# Patient Record
Sex: FEMALE | Race: AMERICAN INDIAN OR ALASKA NATIVE
[De-identification: names, ages, dates, MRNs, and addresses within clinical notes are randomized per-mention and may not be internally consistent; named-entity substitution may affect disease eponyms.]

---

## 2017-09-26 ENCOUNTER — HOSPITAL ENCOUNTER (OUTPATIENT)
Dept: HOSPITAL 5 - XRAY | Age: 37
Discharge: HOME | End: 2017-09-26
Attending: INTERNAL MEDICINE
Payer: MEDICARE

## 2017-09-26 DIAGNOSIS — R10.9: Primary | ICD-10-CM

## 2017-09-26 DIAGNOSIS — Z90.49: ICD-10-CM

## 2017-09-26 PROCEDURE — 74020: CPT

## 2017-09-26 NOTE — XRAY REPORT
ABDOMEN, 2 views:



History: Abdominal pain.



There is no evidence of free air beneath the diaphragms.  The gas 

pattern within the abdomen is unremarkable. There is no evidence of 

bowel dilatation, significant air-fluid levels, or pathologic 

calcifications.  Organ shadows are unremarkable.



Cholecystectomy changes are noted. A peritoneal dialysis catheter 

terminates in the right side of the pelvis.



IMPRESSION:

Unremarkable abdomen.

## 2017-10-17 ENCOUNTER — HOSPITAL ENCOUNTER (EMERGENCY)
Dept: HOSPITAL 5 - ED | Age: 37
LOS: 1 days | Discharge: HOME | End: 2017-10-18
Payer: MEDICARE

## 2017-10-17 DIAGNOSIS — Z91.013: ICD-10-CM

## 2017-10-17 DIAGNOSIS — I12.0: ICD-10-CM

## 2017-10-17 DIAGNOSIS — Z79.01: ICD-10-CM

## 2017-10-17 DIAGNOSIS — N18.6: ICD-10-CM

## 2017-10-17 DIAGNOSIS — Z88.2: ICD-10-CM

## 2017-10-17 DIAGNOSIS — I50.9: ICD-10-CM

## 2017-10-17 DIAGNOSIS — Z88.8: ICD-10-CM

## 2017-10-17 DIAGNOSIS — E11.22: ICD-10-CM

## 2017-10-17 DIAGNOSIS — Z79.4: ICD-10-CM

## 2017-10-17 DIAGNOSIS — M79.661: Primary | ICD-10-CM

## 2017-10-17 PROCEDURE — 85610 PROTHROMBIN TIME: CPT

## 2017-10-17 PROCEDURE — 80048 BASIC METABOLIC PNL TOTAL CA: CPT

## 2017-10-17 PROCEDURE — 36415 COLL VENOUS BLD VENIPUNCTURE: CPT

## 2017-10-17 PROCEDURE — 85025 COMPLETE CBC W/AUTO DIFF WBC: CPT

## 2017-10-17 PROCEDURE — 85379 FIBRIN DEGRADATION QUANT: CPT

## 2017-10-18 VITALS — DIASTOLIC BLOOD PRESSURE: 91 MMHG | SYSTOLIC BLOOD PRESSURE: 187 MMHG

## 2017-10-18 LAB
ANION GAP SERPL CALC-SCNC: 27 MMOL/L
BASOPHILS NFR BLD AUTO: 0.3 % (ref 0–1.8)
BUN SERPL-MCNC: 51 MG/DL (ref 7–17)
BUN/CREAT SERPL: 3 %
CALCIUM SERPL-MCNC: 8.7 MG/DL (ref 8.4–10.2)
CHLORIDE SERPL-SCNC: 96.5 MMOL/L (ref 98–107)
CO2 SERPL-SCNC: 26 MMOL/L (ref 22–30)
EOSINOPHIL NFR BLD AUTO: 0.7 % (ref 0–4.3)
GLUCOSE SERPL-MCNC: 91 MG/DL (ref 65–100)
HCT VFR BLD CALC: 29.9 % (ref 30.3–42.9)
HGB BLD-MCNC: 9.6 GM/DL (ref 10.1–14.3)
INR PPP: 1.08 (ref 0.87–1.13)
MCH RBC QN AUTO: 27 PG (ref 28–32)
MCHC RBC AUTO-ENTMCNC: 32 % (ref 30–34)
MCV RBC AUTO: 83 FL (ref 79–97)
PLATELET # BLD: 254 K/MM3 (ref 140–440)
POTASSIUM SERPL-SCNC: 4 MMOL/L (ref 3.6–5)
RBC # BLD AUTO: 3.62 M/MM3 (ref 3.65–5.03)
SODIUM SERPL-SCNC: 145 MMOL/L (ref 137–145)
WBC # BLD AUTO: 11.3 K/MM3 (ref 4.5–11)

## 2017-10-18 NOTE — EMERGENCY DEPARTMENT REPORT
ED General Adult HPI





- General


Chief complaint: Extremity Problem,Nontraumatic


Stated complaint: POSS BLOOD CLOT R LEG


Time Seen by Provider: 10/18/17 07:00


Source: patient


Mode of arrival: Ambulatory


Limitations: Physical Limitation





- History of Present Illness


Initial comments: 


Patient states she made a trip from [place] to [place] one week ago.  She 

developed leg swelling and pain on Saturday.  Has persisted.  She stated she 

has no prior history of DVT.  However, she did have a negative workup for DVT 

last year.  She has a family history of DVT in her mother and grandmother.  She 

is on peritoneal dialysis.  Apparently she's had some intermittent leg swelling 

in the past.  According to her records she was previously on Coumadin.  I 

reviewed the recent hospital studies, there is a negative CT of the chest for 

pulmonary embolism and a negative Doppler within the year.  During this problem 

the patient reports no shortness of breath, chest pain or hemoptysis or any 

unusual cough.





-: Gradual, days(s)


Location: right, lower extremity


Radiation: non-radiation


Quality: aching


Consistency: intermittent


Improves with: none


Worsens with: none


Associated Symptoms: denies other symptoms


Treatments Prior to Arrival: none





- Related Data


 Home Medications











 Medication  Instructions  Recorded  Confirmed  Last Taken


 


Sevelamer Carbonate [Renvela] 2.4 gm PO TIDWM 01/23/15 05/05/16 05/04/16


 


Insulin Glargine [Lantus VIAL] 15 units SQ QHS 05/05/16 05/05/16 05/03/16


 


Insulin NPH/Regular [NovoLIN 70/30] 30 unit SQ TID 05/05/16 05/05/16 05/04/16


 


amLODIPine [Norvasc] 5 mg PO DAILY 05/05/16 05/05/16 05/04/16








 Previous Rx's











 Medication  Instructions  Recorded  Last Taken  Type


 


Warfarin [Coumadin] 5 mg PO DAILY #10 tablet 01/25/15 05/04/16 Rx


 


traMADol [Ultram] 50 mg PO Q6HR PRN #10 tablet 06/01/16 Unknown Rx


 


HYDROcodone/ACETAMINOPHEN [[place] 1 each PO Q6H #10 tablet 10/18/17 Unknown Rx





5-325 Tablet]    











 Allergies











Allergy/AdvReac Type Severity Reaction Status Date / Time


 


shellfish derived Allergy  Shortness Verified 06/04/15 07:17





   of Breath  


 


sulfamethoxazole Allergy  Shortness Verified 06/04/15 07:17





[From Bactrim]   of Breath  


 


trimethoprim [From Bactrim] Allergy  Shortness Verified 06/04/15 07:17





   of Breath  














ED Review of Systems


ROS: 


Stated complaint: POSS BLOOD CLOT R LEG


Other details as noted in HPI





Constitutional: denies: chills, fever


Eyes: denies: eye pain, eye discharge, vision change


ENT: denies: ear pain, throat pain


Respiratory: denies: cough, shortness of breath, wheezing


Cardiovascular: denies: chest pain, palpitations


Endocrine: no symptoms reported


Gastrointestinal: denies: abdominal pain, nausea, diarrhea


Genitourinary: denies: urgency, dysuria, discharge


Musculoskeletal: as per HPI, other (right calf pain and swelling).  denies: 

back pain, joint swelling, arthralgia


Skin: denies: rash, lesions


Neurological: denies: headache, weakness, paresthesias


Psychiatric: denies: anxiety, depression


Hematological/Lymphatic: denies: easy bleeding, easy bruising





ED Past Medical Hx





- Past Medical History


Hx Hypertension: Yes


Hx Congestive Heart Failure: Yes


Hx Diabetes: Yes (I)


Hx Renal Disease: Yes (dialysis Everyday.)





- Surgical History


Hx Cholecystectomy: Yes


Hx Appendectomy: Yes


Additional Surgical History: graft right arm.  right ovary removed





- Social History


Smoking Status: Unknown if ever smoked


Substance Use Type: None





- Medications


Home Medications: 


 Home Medications











 Medication  Instructions  Recorded  Confirmed  Last Taken  Type


 


Sevelamer Carbonate [Renvela] 2.4 gm PO TIDWM 01/23/15 05/05/16 05/04/16 History


 


Warfarin [Coumadin] 5 mg PO DAILY #10 tablet 01/25/15 05/05/16 05/04/16 Rx


 


Insulin Glargine [Lantus VIAL] 15 units SQ QHS 05/05/16 05/05/16 05/03/16 

History


 


Insulin NPH/Regular [NovoLIN 70/30] 30 unit SQ TID 05/05/16 05/05/16 05/04/16 

History


 


amLODIPine [Norvasc] 5 mg PO DAILY 05/05/16 05/05/16 05/04/16 History


 


traMADol [Ultram] 50 mg PO Q6HR PRN #10 tablet 06/01/16  Unknown Rx


 


HYDROcodone/ACETAMINOPHEN [[place] 1 each PO Q6H #10 tablet 10/18/17  Unknown Rx





5-325 Tablet]     














ED Physical Exam





- General


Limitations: Physical Limitation


General appearance: alert, in no apparent distress





- Head


Head exam: Present: atraumatic, normocephalic





- Eye


Eye exam: Present: normal appearance, PERRL, EOMI.  Absent: scleral icterus





- ENT


ENT exam: Present: mucous membranes moist





- Neck


Neck exam: Present: normal inspection





- Respiratory


Respiratory exam: Present: normal lung sounds bilaterally.  Absent: respiratory 

distress





- Cardiovascular


Cardiovascular Exam: Present: regular rate, normal rhythm.  Absent: systolic 

murmur, diastolic murmur, rubs, gallop





- GI/Abdominal


GI/Abdominal exam: Present: soft, normal bowel sounds.  Absent: distended, 

tenderness, guarding, rebound





- Extremities Exam


Extremities exam: Present: full ROM, normal capillary refill, calf tenderness, 

other (there is some pretibial edema in the right lower extremity)





- Back Exam


Back exam: Present: normal inspection





- Neurological Exam


Neurological exam: Present: alert, oriented X3, CN II-XII intact.  Absent: 

motor sensory deficit





- Psychiatric


Psychiatric exam: Present: normal affect, normal mood





- Skin


Skin exam: Present: warm, dry, intact, normal color.  Absent: rash





- Other


Other exam information: 


Distal pulses DP and PT are symmetrical and 2+ bilaterally








ED Course


 Vital Signs











  10/17/17 10/18/17 10/18/17





  22:45 04:20 05:15


 


Temperature 98.3 F  


 


Pulse Rate 96 H  


 


Respiratory 18  





Rate   


 


Blood Pressure 179/84 178/85 


 


Blood Pressure   





[Left]   


 


O2 Sat by Pulse 100  100





Oximetry   














  10/18/17 10/18/17 10/18/17





  05:30 05:46 06:00


 


Temperature   


 


Pulse Rate   


 


Respiratory   





Rate   


 


Blood Pressure 178/85 178/85 180/90


 


Blood Pressure   





[Left]   


 


O2 Sat by Pulse 100 100 100





Oximetry   














  10/18/17 10/18/17 10/18/17





  06:10 07:49 07:51


 


Temperature  98.5 F 


 


Pulse Rate 80 91 H 


 


Respiratory  18 18





Rate   


 


Blood Pressure   


 


Blood Pressure  165/73 





[Left]   


 


O2 Sat by Pulse 100 100 100





Oximetry   














  10/18/17





  08:39


 


Temperature 97.9 F


 


Pulse Rate 93 H


 


Respiratory 16





Rate 


 


Blood Pressure 


 


Blood Pressure 187/91





[Left] 


 


O2 Sat by Pulse 97





Oximetry 














ED Medical Decision Making





- Lab Data


Result diagrams: 


 10/18/17 07:37





 10/18/17 07:37








 Laboratory Results - last 24 hr











  10/17/17





  23:06


 


PT  14.6


 


INR  1.08


 


D-Dimer  306.18 H














- Radiology Data


Doppler is negative per vascular lab





Critical care attestation.: 


If time is entered above; I have spent that time in minutes in the direct care 

of this critically ill patient, excluding procedure time.








ED Disposition


Clinical Impression: 


 Pain of right calf, End stage renal disease





Disposition: DC-01 TO HOME OR SELFCARE


Is pt being admited?: No


Does the pt Need Aspirin: No


Condition: Stable


Instructions:  Musculoskeletal Pain (ED)


Additional Instructions: 


A Doppler exam is recommended within 1-2 weeks.  This can be obtained as an 

outpatient.  You may return here as well if you're unable.  Elevate leg and 

follow up with her primary care physician.  Return if any difficulty in 

breathing or worsening leg swelling or pain as needed as well.


Prescriptions: 


HYDROcodone/ACETAMINOPHEN [Norco 5-325 Tablet] 1 each PO Q6H #10 tablet


Referrals: 


PRIMARY CARE,MD [Primary Care Provider] - 3-5 Days


Time of Disposition: 09:13

## 2017-10-18 NOTE — VASCULAR LAB REPORT
Right Lower Extremity Venous Duplex Study:



Reason for Exam: Pain and swelling of the right lower extremity.



Comments on the Right:

All veins visualized are freely compressible without evidence of 

internal echogenicity.  Flow is spontaneous and phasic throughout. No 

evidence of acute or chronic thrombus is seen in any of the vessels 

visualized.



Comments on the Left:

A limited duplex study was done of the proximal veins of the left lower 

extremity. All veins visualized are freely compressible without 

evidence of internal echogenicity.  Flow is spontaneous and phasic 

throughout. No evidence of acute or chronic thrombus is seen in any of 

the vessels visualized.



Impression:

No evidence of acute or chronic deep venous thrombosis in the right 

lower extremity.

## 2018-08-17 ENCOUNTER — HOSPITAL ENCOUNTER (OUTPATIENT)
Dept: HOSPITAL 5 - CATHLABREC | Age: 38
Discharge: HOME | End: 2018-08-17
Attending: SURGERY
Payer: MEDICARE

## 2018-08-17 VITALS — DIASTOLIC BLOOD PRESSURE: 70 MMHG | SYSTOLIC BLOOD PRESSURE: 138 MMHG

## 2018-08-17 DIAGNOSIS — Z79.4: ICD-10-CM

## 2018-08-17 DIAGNOSIS — I87.1: ICD-10-CM

## 2018-08-17 DIAGNOSIS — Z98.890: ICD-10-CM

## 2018-08-17 DIAGNOSIS — N18.6: ICD-10-CM

## 2018-08-17 DIAGNOSIS — I87.2: Primary | ICD-10-CM

## 2018-08-17 DIAGNOSIS — M19.90: ICD-10-CM

## 2018-08-17 DIAGNOSIS — Z99.2: ICD-10-CM

## 2018-08-17 DIAGNOSIS — I50.9: ICD-10-CM

## 2018-08-17 DIAGNOSIS — Z90.49: ICD-10-CM

## 2018-08-17 DIAGNOSIS — E11.22: ICD-10-CM

## 2018-08-17 DIAGNOSIS — Z88.2: ICD-10-CM

## 2018-08-17 DIAGNOSIS — Z79.01: ICD-10-CM

## 2018-08-17 DIAGNOSIS — Z88.1: ICD-10-CM

## 2018-08-17 DIAGNOSIS — Z91.013: ICD-10-CM

## 2018-08-17 DIAGNOSIS — I13.2: ICD-10-CM

## 2018-08-17 LAB
APTT BLD: 29.2 SEC. (ref 24.2–36.6)
BASOPHILS # (AUTO): 0.1 K/MM3 (ref 0–0.1)
BASOPHILS NFR BLD AUTO: 0.6 % (ref 0–1.8)
BUN SERPL-MCNC: 34 MG/DL (ref 7–17)
BUN/CREAT SERPL: 2 %
CALCIUM SERPL-MCNC: 9.5 MG/DL (ref 8.4–10.2)
EOSINOPHIL # BLD AUTO: 0.1 K/MM3 (ref 0–0.4)
EOSINOPHIL NFR BLD AUTO: 1.2 % (ref 0–4.3)
HCT VFR BLD CALC: 39.2 % (ref 30.3–42.9)
HEMOLYSIS INDEX: 10
HGB BLD-MCNC: 12.6 GM/DL (ref 10.1–14.3)
INR PPP: 1.41 (ref 0.87–1.13)
LYMPHOCYTES # BLD AUTO: 2 K/MM3 (ref 1.2–5.4)
LYMPHOCYTES NFR BLD AUTO: 20.5 % (ref 13.4–35)
MCH RBC QN AUTO: 28 PG (ref 28–32)
MCHC RBC AUTO-ENTMCNC: 32 % (ref 30–34)
MCV RBC AUTO: 87 FL (ref 79–97)
MONOCYTES # (AUTO): 0.8 K/MM3 (ref 0–0.8)
MONOCYTES % (AUTO): 7.7 % (ref 0–7.3)
PLATELET # BLD: 375 K/MM3 (ref 140–440)
RBC # BLD AUTO: 4.5 M/MM3 (ref 3.65–5.03)

## 2018-08-17 PROCEDURE — 76937 US GUIDE VASCULAR ACCESS: CPT

## 2018-08-17 PROCEDURE — 37252 INTRVASC US NONCORONARY 1ST: CPT

## 2018-08-17 PROCEDURE — 99157 MOD SED OTHER PHYS/QHP EA: CPT

## 2018-08-17 PROCEDURE — 85730 THROMBOPLASTIN TIME PARTIAL: CPT

## 2018-08-17 PROCEDURE — 99156 MOD SED OTH PHYS/QHP 5/>YRS: CPT

## 2018-08-17 PROCEDURE — 80048 BASIC METABOLIC PNL TOTAL CA: CPT

## 2018-08-17 PROCEDURE — 75822 VEIN X-RAY ARMS/LEGS: CPT

## 2018-08-17 PROCEDURE — 37238 OPEN/PERQ PLACE STENT SAME: CPT

## 2018-08-17 PROCEDURE — 37253 INTRVASC US NONCORONARY ADDL: CPT

## 2018-08-17 PROCEDURE — 85025 COMPLETE CBC W/AUTO DIFF WBC: CPT

## 2018-08-17 PROCEDURE — 36415 COLL VENOUS BLD VENIPUNCTURE: CPT

## 2018-08-17 PROCEDURE — 37239 OPEN/PERQ PLACE STENT EA ADD: CPT

## 2018-08-17 PROCEDURE — C1725 CATH, TRANSLUMIN NON-LASER: HCPCS

## 2018-08-17 PROCEDURE — 85610 PROTHROMBIN TIME: CPT

## 2018-08-17 PROCEDURE — C1894 INTRO/SHEATH, NON-LASER: HCPCS

## 2018-08-17 PROCEDURE — C1753 CATH, INTRAVAS ULTRASOUND: HCPCS

## 2018-08-17 PROCEDURE — C1876 STENT, NON-COA/NON-COV W/DEL: HCPCS

## 2018-08-17 RX ADMIN — HYDROMORPHONE HYDROCHLORIDE ONE MG: 1 INJECTION, SOLUTION INTRAMUSCULAR; INTRAVENOUS; SUBCUTANEOUS at 17:13

## 2018-08-17 RX ADMIN — FENTANYL CITRATE ONE MCG: 50 INJECTION, SOLUTION INTRAMUSCULAR; INTRAVENOUS at 15:57

## 2018-08-17 RX ADMIN — MIDAZOLAM ONE MG: 1 INJECTION INTRAMUSCULAR; INTRAVENOUS at 16:05

## 2018-08-17 RX ADMIN — LIDOCAINE HYDROCHLORIDE ONE ML: 20 INJECTION, SOLUTION INFILTRATION; PERINEURAL at 15:57

## 2018-08-17 RX ADMIN — FENTANYL CITRATE ONE MCG: 50 INJECTION, SOLUTION INTRAMUSCULAR; INTRAVENOUS at 16:14

## 2018-08-17 RX ADMIN — MIDAZOLAM ONE MG: 1 INJECTION INTRAMUSCULAR; INTRAVENOUS at 16:14

## 2018-08-17 RX ADMIN — MIDAZOLAM ONE MG: 1 INJECTION INTRAMUSCULAR; INTRAVENOUS at 15:57

## 2018-08-17 RX ADMIN — LIDOCAINE HYDROCHLORIDE ONE ML: 20 INJECTION, SOLUTION INFILTRATION; PERINEURAL at 16:05

## 2018-08-17 RX ADMIN — LIDOCAINE HYDROCHLORIDE ONE ML: 20 INJECTION, SOLUTION INFILTRATION; PERINEURAL at 16:11

## 2018-08-17 RX ADMIN — FENTANYL CITRATE ONE MCG: 50 INJECTION, SOLUTION INTRAMUSCULAR; INTRAVENOUS at 16:05

## 2018-08-17 RX ADMIN — HYDROMORPHONE HYDROCHLORIDE ONE MG: 1 INJECTION, SOLUTION INTRAMUSCULAR; INTRAVENOUS; SUBCUTANEOUS at 16:56

## 2018-08-17 NOTE — OPERATIVE REPORT
Operative Report


Operative Report: 





Date of Procedure: 08/17/2018





Pre-operative Diagnosis: Bilateral Lower Extremity Venous Insufficiency





Post-operative Diagnosis: Same





Procedure(s): 


1.  Ultrasound Guided Access Left Common Femoral Vein


2.  Ultrasound Guided Access Right Common Femoral Vein


3.  Bilateral Lower Extremity Venogram with Iliocaval Venogram (No Previous 

Films for Comparison)


4.  IVUS Left Common Iliac Vein and Left External Iliac Vein


5.  IVUS Right Common Iliac Vein and Right External Iliac Vein


6.  Angioplasy and Stent of Left Common Iliac Vein with 14 x 40 Balloon and 14 

x 90 Wall Stent


7.  Angioplasty and Stent of Left External Iliac Vein with 12 x 40 Balloon and 

12 x 60 Protege Stent


8.  Stent Right Common and External Iliac Veins with 14 x 90 Wall Stent


9.  Radiologic Supervision with Interpretation





Surgeon: Rinku Sandoval M.D. 





Assistant: None





Anesthesia: Local and IV Sedation





EBL: Minimal





Counts: Correct





Complications: None





Condition: Stable





Findings:  Right lower extremity venogram and ultrasound findings demonstrated 

that the right common iliac and external iliac veins had minimal reflux and no 

significant stenosis was identified on the ultrasound.  Venogram on the left 

lower extremity demonstrated significant reflux of contrast in the left common 

iliac into the hypogastric veins.  The external iliac veins were significantly 

stenosed with possibly 70% stenosis.  I have is confirmed finding of May 

Thurner Syndrome with the smallest diameter of the left common iliac vein 

measured at approximately 6 mm as well as a measurement of a measurement of 6 

mm in the external iliac vein.  After intervention there was no significant 

residual reflux on venogram with brisk flow of contrast through the left venous 

system into the inferior vena cava.





Specimen: None





Indication: 





The patient is a 37-year-old female who presented to the office with complaints 

of bilateral lower extremity claudication and varicosities on bilateral flanks 

and abdomen present for the past several months.  She denies significant lower 

extremity edema however she states that she rarely leaves the house much and 

sits at home with her feet elevated secondary to the short distance 

claudication.  She had a venous reflux study that demonstrated significant 

reflux in the superficial as well as the deep veins.  Given these findings she 

was offered bilateral lower extremity venogram.  She was given the risks, 

benefits, and alternative procedures and consented to the procedure.





Description of Procedure:  





The patient was brought to the Cath Lab and laid in supine position.  After she 

was adequately sedated bilateral groins were prepped and draped in normal 

sterile fashion.  Several used to identify the left femoral vein and the skin 

and soft tissue was anesthetized with lidocaine.  A small stab incision was 

created with an 11 blade and then micropuncture technique was used with 

ultrasound guidance to enter the vein.  A 5 [place] sheath was then placed by 

Seldinger technique and a 0.035 Bentson wire was advanced into the superior 

vena cava under fluoroscopy.  A venogram was then performed with the previously 

described findings.  Ultrasound was used to identify the right femoral vein in 

the overlying skin and soft tissue was anesthetized with lidocaine.  A small 

stab incision was created with an 11 blade and micropuncture technique was then 

used to into the vein under ultrasound guidance.  A 5 [place] sheath was then 

placed by Seldinger technique.  A venogram was then performed to produce a 

described findings.  Each 5 [place] sheath was then replaced with a 10 [place] 

sheath by some technique. IVUS was performed on each side respectively by 

advancing the IVUS catheter into the inferior vena cava and pulling the 

catheter slowly back into the common femoral vein on each respective side.  The 

findings are as previously described.  After noting these findings I advanced a 

14 x 40 balloon into the left common iliac vein and performed angioplasty of 

the vein was approximately 50% residual stenosis.  I then performed angioplasty 

of the left external leg vein with a 12 x 40 balloon with approximately 60% 

residual stenosis.  I placed a 12 x 60 Protege self-expanding stent in the left 

external iliac vein and postdilated this with a 12 x 40 balloon with the 

finding of less than 15% residual stenosis.  I then advanced a 14 x 90 Wall 

Stent of both the right and left sides and were deployed simultaneously so that 

I would not care home the


Right common iliac vein.  The right stent ended in the right external iliac 

vein and the left stent ended in the left extremity leg vein within the 12 mm 

previously deployed stent.  The left common iliac stent was postdilated with a 

14 mm balloon with less than 15% residual stenosis.  At this point all balloons 

and wires were removed and the final venogram demonstrated brisk flow of 

contrast through both iliac veins and into the inferior vena cava without 

significant reflux.  The patient tolerated the procedure well.  All sponge, 

needle, and instrument counts were correct.  The patient was taken to the 

recovery area in stable condition.

## 2018-08-17 NOTE — SHORT STAY SUMMARY
Short Stay Documentation


Date of service: 08/17/18


Narrative H&P: 





See H&P





- History


H&P: obtained from office





- Allergies and Medications


Current Medications: 


 Allergies





shellfish derived Allergy (Verified 06/04/15 07:17)


 Shortness of Breath


sulfamethoxazole [From Bactrim] Allergy (Verified 06/04/15 07:17)


 Shortness of Breath


trimethoprim [From Bactrim] Allergy (Verified 06/04/15 07:17)


 Shortness of Breath





 Home Medications











 Medication  Instructions  Recorded  Confirmed  Last Taken  Type


 


Sevelamer Carbonate [Renvela] 2.4 gm PO TIDWM 01/23/15 08/17/18 08/16/18 History





    2.4GM 


 


Warfarin [Coumadin] 5 mg PO DAILY #10 tablet 01/25/15 08/17/18 08/16/18 Rx





    5mg 


 


Insulin Glargine [Lantus VIAL] 15 units SQ QHS 05/05/16 08/17/18 08/16/18 

History





    15units 


 


Insulin NPH/Regular [NovoLIN 70/30] 30 unit SQ TID 05/05/16 08/17/18 08/16/18 

History





    30units 


 


amLODIPine [Norvasc] 5 mg PO DAILY 05/05/16 08/17/18 08/16/18 History





    5mg 








Active Medications





Acetaminophen/Hydrocodone Bitart (Norco 5/325)  1 each PO ONCE ONE


   Stop: 08/17/18 17:58


Cefazolin Sodium (Ancef/Sterile Water 2 Gm/20 Ml)  2 gm in 20 mls @ 80 mls/hr 

IV PREOP NR; Protocol


   Stop: 08/17/18 23:59


   Last Admin: 08/17/18 16:06 Dose:  20 mls


Sodium Chloride (Nacl 0.9% 1000 Ml)  1,000 mls @ 42 mls/hr IV AS DIRECT MARK


   Last Admin: 08/17/18 16:05 Dose:  200 mls











- Brief post op/procedure progress note


Date of procedure: 08/17/18


Pre-op diagnosis: Bilateral Lower Extremity Venous Insufficiency


Post-op diagnosis: same


Procedure: 





1.  Ultrasound Guided Access Left Common Femoral Vein


2.  Ultrasound Guided Access Right Common Femoral Vein


3.  Bilateral Lower Extremity Venogram with Iliocaval Venogram


4.  IVUS Left Common Iliac Vein and Left External Iliac Vein


5.  IVUS Right Common Iliac Vein and Right External Iliac Vein


6.  Angioplasy and Stent of Left Common Iliac Vein with 14 x 40 Balloon and 14 

x 90 Wall Stent


7.  Angioplasty and Stent of Left External Iliac Vein with 12 x 40 Balloon and 

12 x 60 Protege Stent


8.  Stent Right Common and External Iliac Veins with 14 x 90 Wall Stent


9.  Radiologic Supervision with Interpretation





Anesthesia: local, other (I.V. Sedation)


Surgeon: JEISON GARCIA


Estimated blood loss: minimal


Pathology: none


Condition: stable





- Disposition


Condition at discharge: Good


Disposition: DC-01 TO HOME OR SELFCARE





Short Stay Discharge Plan


Activity: other (No strenuous activity for 24 hours)


Diet: regular


Wound: remove dressing (24 hours)


Follow up with: 


JEISON GARCIA MD [Staff Physician] - 14 Days


Prescriptions: 


HYDROcodone/APAP 7.5-325 [[place] 7.5/325] 1 each PO Q6HR PRN #40 tablet


 PRN Reason: Pain